# Patient Record
Sex: FEMALE | Race: OTHER | HISPANIC OR LATINO | URBAN - METROPOLITAN AREA
[De-identification: names, ages, dates, MRNs, and addresses within clinical notes are randomized per-mention and may not be internally consistent; named-entity substitution may affect disease eponyms.]

---

## 2023-01-16 ENCOUNTER — EMERGENCY (EMERGENCY)
Facility: HOSPITAL | Age: 23
LOS: 1 days | Discharge: HOME | End: 2023-01-16
Attending: EMERGENCY MEDICINE | Admitting: EMERGENCY MEDICINE
Payer: SELF-PAY

## 2023-01-16 VITALS
DIASTOLIC BLOOD PRESSURE: 79 MMHG | RESPIRATION RATE: 18 BRPM | WEIGHT: 134.48 LBS | HEIGHT: 64 IN | SYSTOLIC BLOOD PRESSURE: 118 MMHG | HEART RATE: 91 BPM | OXYGEN SATURATION: 98 % | TEMPERATURE: 98 F

## 2023-01-16 VITALS
HEART RATE: 88 BPM | DIASTOLIC BLOOD PRESSURE: 77 MMHG | RESPIRATION RATE: 18 BRPM | TEMPERATURE: 98 F | OXYGEN SATURATION: 100 % | SYSTOLIC BLOOD PRESSURE: 111 MMHG

## 2023-01-16 PROCEDURE — 99284 EMERGENCY DEPT VISIT MOD MDM: CPT

## 2023-01-16 PROCEDURE — 99283 EMERGENCY DEPT VISIT LOW MDM: CPT

## 2023-01-16 RX ORDER — CIPROFLOXACIN LACTATE 400MG/40ML
1 VIAL (ML) INTRAVENOUS
Qty: 20 | Refills: 0
Start: 2023-01-16 | End: 2023-01-25

## 2023-01-16 NOTE — ED PROVIDER NOTE - PATIENT PORTAL LINK FT
You can access the FollowMyHealth Patient Portal offered by [facility] by registering at the following website: [email or web address]. By joining [employer]’s FollowMyHealth portal, you will also be able to view your health information using other applications (apps) compatible with our system.

## 2023-01-16 NOTE — ED ADULT NURSE NOTE - OBJECTIVE STATEMENT
23 YOF A&OX3 presents for left ear pain. pt states left ear has been throbbing and painful  for 3 days. pt denies fevers/chills, cough, nasal congestion, headaches, dizziness. safety maintained.

## 2023-01-16 NOTE — ED PROVIDER NOTE - NS ED ATTENDING STATEMENT MOD
This was a shared visit with the OLIVIA. I reviewed and verified the documentation and independently performed the documented:

## 2023-01-16 NOTE — ED PROVIDER NOTE - PROGRESS NOTE DETAILS
Patient stable.  Will change antibiotics to Cipro to cover for Pseudomonas.  Discussed potential side effects of fluoroquinolones including tendon rupture and patient made aware.  Recommend close follow-up with ENT.  Discussed may need drainage for worsening symptoms or increased fluctuance.  Referrals provided.

## 2023-01-16 NOTE — ED PROVIDER NOTE - OBJECTIVE STATEMENT
Otherwise healthy 23-year-old female presents with redness, swelling, and pain to left pinna the past 2 to 3 days.  States pain moderate in nature.  Denies fever, chills, or body aches.  Denies any injury or trauma.  Denies any known bites.  No recent piercings.  No recent illnesses.  Was seen at urgent care yesterday and diagnosed with cellulitis.  Was prescribed doxycycline but did not  prescription yet.  Was told to come to emergency room for any worsening symptoms.  Noticed the swelling and pain seem to be worse this morning so came to emergency room.  Denies any pain to the inside of her ear.  no PCP

## 2023-01-16 NOTE — ED PROVIDER NOTE - ENMT, MLM
Airway patent, Throat has no vesicles, no oropharyngeal exudates and uvula is midline. TM's normal in appearance. diffuse erythema, swelling, and soft tissue tenderness to cartilage of left ear. no mastoid tenderness

## 2023-01-16 NOTE — ED PROVIDER NOTE - NSFOLLOWUPINSTRUCTIONS_ED_ALL_ED_FT
Cellulitis, Adult    A person's legs and feet. One leg is normal and the other leg is affected by cellulitis.   Cellulitis is a skin infection. The infected area is often warm, red, swollen, and sore. It occurs most often in the arms and lower legs. It is very important to get treated for this condition.      What are the causes?    This condition is caused by bacteria. The bacteria enter through a break in the skin, such as a cut, burn, insect bite, open sore, or crack.      What increases the risk?    This condition is more likely to occur in people who:  •Have a weak body defense system (immune system).       •Have open cuts, burns, bites, or scrapes on the skin.      •Are older than 60 years of age.      •Have a blood sugar problem (diabetes).       •Have a long-lasting (chronic) liver disease (cirrhosis) or kidney disease.      •Are very overweight (obese).    •Have a skin problem, such as:  •Itchy rash (eczema).      •Slow movement of blood in the veins (venous stasis).      •Fluid buildup below the skin (edema).        •Have been treated with high-energy rays (radiation).      •Use IV drugs.         What are the signs or symptoms?    Symptoms of this condition include:•Skin that is:  •Red.      •Streaking.      •Spotting.      •Swollen.      •Sore or painful when you touch it.      •Warm.        •A fever.      •Chills.       •Blisters.        How is this diagnosed?    This condition is diagnosed based on:  •Medical history.      •Physical exam.      •Blood tests.      •Imaging tests.        How is this treated?    Treatment for this condition may include:  •Medicines to treat infections or allergies.    •Home care, such as:  •Rest.      •Placing cold or warm cloths (compresses) on the skin.        •Hospital care, if the condition is very bad.        Follow these instructions at home:    Medicines     •Take over-the-counter and prescription medicines only as told by your doctor.      •If you were prescribed an antibiotic medicine, take it as told by your doctor. Do not stop taking it even if you start to feel better.        General instructions   A comparison of three sample cups showing dark yellow, yellow, and pale yellow urine.   •Drink enough fluid to keep your pee (urine) pale yellow.      • Do not touch or rub the infected area.      •Raise (elevate) the infected area above the level of your heart while you are sitting or lying down.      •Place cold or warm cloths on the area as told by your doctor.      •Keep all follow-up visits as told by your doctor. This is important.        Contact a doctor if:    •You have a fever.      •You do not start to get better after 1–2 days of treatment.      •Your bone or joint under the infected area starts to hurt after the skin has healed.      •Your infection comes back. This can happen in the same area or another area.      •You have a swollen bump in the area.      •You have new symptoms.      •You feel ill and have muscle aches and pains.        Get help right away if:    •Your symptoms get worse.      •You feel very sleepy.      •You throw up (vomit) or have watery poop (diarrhea) for a long time.      •You see red streaks coming from the area.      •Your red area gets larger.      •Your red area turns dark in color.      These symptoms may represent a serious problem that is an emergency. Do not wait to see if the symptoms will go away. Get medical help right away. Call your local emergency services (911 in the [place] Do not drive yourself to the hospital.       Summary    •Cellulitis is a skin infection. The area is often warm, red, swollen, and sore.      •This condition is treated with medicines, rest, and cold and warm cloths.      •Take all medicines only as told by your doctor.      •Tell your doctor if symptoms do not start to get better after 1–2 days of treatment.      This information is not intended to replace advice given to you by your health care provider. Make sure you discuss any questions you have with your health care provider. Start taking Cipro twice a day for next 10 days  Tylenol or Motrin over-the-counter for pain and discomfort  Ice or moist heat to the region  Have close follow-up with ENT, referrals provided.  Call tomorrow to arrange appointment      Perichondritis, Adult       Perichondritis is an infection of the outer ear (pinna) that is caused by bacteria. The pinna is shaped to direct sound into the inner ear. It gets its shape from a firm, elastic tissue (cartilage). The cartilage is protected by another tissue called the perichondrium, which is just under the skin of the pinna.    If you injure your ear, bacteria can get under the skin and infect the perichondrium. The infection can cause swelling and pain. A collection of pus (abscess) may form between the perichondrium and the cartilage. This separates the perichondrium and its blood supply from the cartilage of the ear. A severe or untreated infection can lead to a type of ear deformity called cauliflower ear.      What are the causes?    This condition is caused by bacteria that enters the perichondrium when the outer ear is injured. The injury often results from an upper ear piercing that goes through cartilage instead of the ear lobe. Other causes include:  •Injury to the outer ear (trauma).      •Using needles in or on the ear to treat problems (acupuncture).      •Insect bite.      •Ear pimple or boil.      •Ear surgery.      •Ear burn.        What increases the risk?    You are more likely to develop this condition if:  •You have diabetes.      •You have a weak body defense system (immune system).        What are the signs or symptoms?    Symptoms of this condition include:  •Dull pain that gets worse.      •Increasing ear swelling.      •Warm, tender, red skin over the ear.      •Fever.      •Swollen lymph nodes near the ear.      •Pus draining from the ear.        How is this diagnosed?    This condition may be diagnosed based on:  •Your signs and symptoms, especially if you had a recent ear injury.       •A physical exam. The health care provider will check the ear for swelling, redness, or drainage.      •Lab tests. These may include testing a sample of pus to identify the type of bacteria that is causing the infection (culture).        How is this treated?    This condition is usually treated with antibiotic medicine. You will be given antibiotics that work well against most bacteria that cause the condition. If your infection is severe, you may be given antibiotics through an IV in the hospital. Your antibiotics will be changed if culture results show that your symptoms are caused by a specific kind of bacteria.    Other treatments for this condition may include:  •Using warm or cold compresses to help with pain and discomfort.      •Taking pain medicine.      •Removing pus and dead cartilage with a surgical knife (incision and drainage) or a needle (aspiration).      •Injecting antibiotics and anti-inflammatory medicine (corticosteroids) into the affected area.        Follow these instructions at home:    Medicines     •Take over-the-counter and prescription medicines only as told by your health care provider.       •Take your antibiotic medicine as told by your health care provider. Do not stop taking the antibiotic even if you start to feel better.      Managing pain and swelling   •If directed, put ice on the affected area.  •Put ice in a plastic bag.      •Place a towel between your skin and the bag.      •Leave the ice on for 20 minutes, 2–3 times a day.      •If directed, apply heat to the affected area. Use the heat source that your health care provider recommends, such as a moist heat pack or a heating pad.  •Place a towel between your skin and the heat source.       •Leave the heat on for 20–30 minutes.       •Remove the heat if your skin turns bright red. This is especially important if you are unable to feel pain, heat, or cold. You may have a greater risk of getting burned.        General instructions   •If you had incision and drainage done, follow instructions from your health care provider about how to take care of your incision. Make sure you:   •Wash your hands with soap and water before and after you change your bandage (dressing). If soap and water are not available, use hand .       •Change your dressing as told by your health care provider.       •Check your incision area every day for more redness, swelling, pain, warmth, pus, or a bad smell.      •Do not take baths, swim, or use a hot tub until your health care provider approves.         •Clean the ear as told by your health care provider.      •Resume your usual activities as told by your health care provider. Ask your health care provider what activities are safe for you.       •Keep all follow-up visits as told by your health care provider. This is important.        Contact a health care provider if you have:    •New or increasing chills or fever.      •Any signs of infection that get worse or do not get better. These may include more redness or drainage.      •A severe headache.        Get help right away if you:    •Develop a sudden increase in symptoms such as pain, fever, and swelling.        Summary    •Perichondritis is a bacterial infection of your outer ear. If you injure your ear, bacteria can get under the skin and infect the perichondrium.      •The main symptoms of this condition are pain, swelling, and tender, red skin over the ear.      •This condition can be diagnosed by your signs and symptoms, especially after an ear injury.      •Perichondritis is usually treated with antibiotic medicine. Other treatments include removing pus and dead cartilage with a surgical knife (incision and drainage) or a needle (aspiration).      •Follow instructions about taking medicines, managing pain and swelling, and taking care of your incision if you had incision and drainage done.      This information is not intended to replace advice given to you by your health care provider. Make sure you discuss any questions you have with your health care provider.

## 2023-01-16 NOTE — ED PROVIDER NOTE - CLINICAL SUMMARY MEDICAL DECISION MAKING FREE TEXT BOX
Patient is a 23-year-old female who presents to the emergency room with a chief complaint of redness and swelling to the left ear.  Patient with no significant past medical history.  Reports that 2 weeks ago she did have a left ear infection which was treated with an unknown antibiotic and resolved.  She reports that 2 to 3 days ago she began to experience pain redness and swelling to the left outer ear.  Symptoms have continued to worsen.  Denies any trauma, injury, bite, new piercing to the left ear.  She describes the pain as a throbbing sensation endorses redness and swelling seems to be worsening.  She was seen in urgent care yesterday diagnosed with cellulitis and prescribed doxycycline.  Patient reports that she has not picked up this antibiotic or began it yet.  She was instructed to come to the emergency room for any worsening symptoms and she thought the swelling might of been more significant and so she reported to the emergency room today for further management.  Denies any fevers chills nausea vomiting chest pain or shortness of breath.  On exam patient is sitting up in bed no acute distress.  Left ear: TM clear no mastoid tenderness to palpation.  There is diffuse redness swelling and tenderness to the cartilage of the left ear with increased warmth.  Patient is presenting to the emergency room with what appears to be perichondritis of the left ear.  Will change antibiotics from doxycycline to Cipro for better coverage.  Patient advises that she needs prompt ENT follow-up in 1 to 2 days for recheck and may require drainage of this tear.  Advised to return to the emergency room immediately for any new or worsening symptoms.  Patient denies any chance of pregnancy.  Stable for discharge home.

## 2023-01-19 PROBLEM — Z00.00 ENCOUNTER FOR PREVENTIVE HEALTH EXAMINATION: Status: ACTIVE | Noted: 2023-01-19

## 2023-01-19 PROBLEM — Z78.9 OTHER SPECIFIED HEALTH STATUS: Chronic | Status: ACTIVE | Noted: 2023-01-16

## 2023-01-20 ENCOUNTER — EMERGENCY (EMERGENCY)
Facility: HOSPITAL | Age: 23
LOS: 1 days | Discharge: HOME | End: 2023-01-20
Attending: STUDENT IN AN ORGANIZED HEALTH CARE EDUCATION/TRAINING PROGRAM | Admitting: STUDENT IN AN ORGANIZED HEALTH CARE EDUCATION/TRAINING PROGRAM
Payer: SELF-PAY

## 2023-01-20 VITALS
HEIGHT: 64 IN | OXYGEN SATURATION: 100 % | SYSTOLIC BLOOD PRESSURE: 134 MMHG | HEART RATE: 118 BPM | TEMPERATURE: 98 F | RESPIRATION RATE: 17 BRPM | DIASTOLIC BLOOD PRESSURE: 83 MMHG

## 2023-01-20 VITALS
HEART RATE: 74 BPM | SYSTOLIC BLOOD PRESSURE: 113 MMHG | OXYGEN SATURATION: 100 % | DIASTOLIC BLOOD PRESSURE: 75 MMHG | TEMPERATURE: 98 F | RESPIRATION RATE: 17 BRPM

## 2023-01-20 LAB
ALBUMIN SERPL ELPH-MCNC: 4.2 G/DL — SIGNIFICANT CHANGE UP (ref 3.3–5)
ALP SERPL-CCNC: 73 U/L — SIGNIFICANT CHANGE UP (ref 40–120)
ALT FLD-CCNC: 9 U/L — SIGNIFICANT CHANGE UP (ref 4–33)
ANION GAP SERPL CALC-SCNC: 11 MMOL/L — SIGNIFICANT CHANGE UP (ref 7–14)
AST SERPL-CCNC: 15 U/L — SIGNIFICANT CHANGE UP (ref 4–32)
BASOPHILS # BLD AUTO: 0.04 K/UL — SIGNIFICANT CHANGE UP (ref 0–0.2)
BASOPHILS NFR BLD AUTO: 0.6 % — SIGNIFICANT CHANGE UP (ref 0–2)
BILIRUB SERPL-MCNC: 0.2 MG/DL — SIGNIFICANT CHANGE UP (ref 0.2–1.2)
BUN SERPL-MCNC: 9 MG/DL — SIGNIFICANT CHANGE UP (ref 7–23)
CALCIUM SERPL-MCNC: 9.4 MG/DL — SIGNIFICANT CHANGE UP (ref 8.4–10.5)
CHLORIDE SERPL-SCNC: 106 MMOL/L — SIGNIFICANT CHANGE UP (ref 98–107)
CO2 SERPL-SCNC: 24 MMOL/L — SIGNIFICANT CHANGE UP (ref 22–31)
CREAT SERPL-MCNC: 0.81 MG/DL — SIGNIFICANT CHANGE UP (ref 0.5–1.3)
EGFR: 103 ML/MIN/1.73M2 — SIGNIFICANT CHANGE UP
EOSINOPHIL # BLD AUTO: 0.09 K/UL — SIGNIFICANT CHANGE UP (ref 0–0.5)
EOSINOPHIL NFR BLD AUTO: 1.3 % — SIGNIFICANT CHANGE UP (ref 0–6)
GLUCOSE SERPL-MCNC: 90 MG/DL — SIGNIFICANT CHANGE UP (ref 70–99)
HCT VFR BLD CALC: 40.3 % — SIGNIFICANT CHANGE UP (ref 34.5–45)
HGB BLD-MCNC: 12.7 G/DL — SIGNIFICANT CHANGE UP (ref 11.5–15.5)
IANC: 4.37 K/UL — SIGNIFICANT CHANGE UP (ref 1.8–7.4)
IMM GRANULOCYTES NFR BLD AUTO: 0.4 % — SIGNIFICANT CHANGE UP (ref 0–0.9)
LYMPHOCYTES # BLD AUTO: 1.8 K/UL — SIGNIFICANT CHANGE UP (ref 1–3.3)
LYMPHOCYTES # BLD AUTO: 26 % — SIGNIFICANT CHANGE UP (ref 13–44)
MCHC RBC-ENTMCNC: 27.3 PG — SIGNIFICANT CHANGE UP (ref 27–34)
MCHC RBC-ENTMCNC: 31.5 GM/DL — LOW (ref 32–36)
MCV RBC AUTO: 86.5 FL — SIGNIFICANT CHANGE UP (ref 80–100)
MONOCYTES # BLD AUTO: 0.59 K/UL — SIGNIFICANT CHANGE UP (ref 0–0.9)
MONOCYTES NFR BLD AUTO: 8.5 % — SIGNIFICANT CHANGE UP (ref 2–14)
NEUTROPHILS # BLD AUTO: 4.37 K/UL — SIGNIFICANT CHANGE UP (ref 1.8–7.4)
NEUTROPHILS NFR BLD AUTO: 63.2 % — SIGNIFICANT CHANGE UP (ref 43–77)
NRBC # BLD: 0 /100 WBCS — SIGNIFICANT CHANGE UP (ref 0–0)
NRBC # FLD: 0 K/UL — SIGNIFICANT CHANGE UP (ref 0–0)
PLATELET # BLD AUTO: 241 K/UL — SIGNIFICANT CHANGE UP (ref 150–400)
POTASSIUM SERPL-MCNC: 4.3 MMOL/L — SIGNIFICANT CHANGE UP (ref 3.5–5.3)
POTASSIUM SERPL-SCNC: 4.3 MMOL/L — SIGNIFICANT CHANGE UP (ref 3.5–5.3)
PROT SERPL-MCNC: 7.6 G/DL — SIGNIFICANT CHANGE UP (ref 6–8.3)
RBC # BLD: 4.66 M/UL — SIGNIFICANT CHANGE UP (ref 3.8–5.2)
RBC # FLD: 13 % — SIGNIFICANT CHANGE UP (ref 10.3–14.5)
SODIUM SERPL-SCNC: 141 MMOL/L — SIGNIFICANT CHANGE UP (ref 135–145)
WBC # BLD: 6.92 K/UL — SIGNIFICANT CHANGE UP (ref 3.8–10.5)
WBC # FLD AUTO: 6.92 K/UL — SIGNIFICANT CHANGE UP (ref 3.8–10.5)

## 2023-01-20 PROCEDURE — 70481 CT ORBIT/EAR/FOSSA W/DYE: CPT | Mod: 26,MA

## 2023-01-20 PROCEDURE — 99284 EMERGENCY DEPT VISIT MOD MDM: CPT

## 2023-01-20 RX ORDER — KETOROLAC TROMETHAMINE 30 MG/ML
15 SYRINGE (ML) INJECTION ONCE
Refills: 0 | Status: DISCONTINUED | OUTPATIENT
Start: 2023-01-20 | End: 2023-01-20

## 2023-01-20 RX ORDER — CEPHALEXIN 500 MG
1 CAPSULE ORAL
Qty: 28 | Refills: 0
Start: 2023-01-20 | End: 2023-01-26

## 2023-01-20 RX ORDER — SODIUM CHLORIDE 9 MG/ML
1000 INJECTION INTRAMUSCULAR; INTRAVENOUS; SUBCUTANEOUS ONCE
Refills: 0 | Status: COMPLETED | OUTPATIENT
Start: 2023-01-20 | End: 2023-01-20

## 2023-01-20 RX ORDER — CEPHALEXIN 500 MG
500 CAPSULE ORAL ONCE
Refills: 0 | Status: COMPLETED | OUTPATIENT
Start: 2023-01-20 | End: 2023-01-20

## 2023-01-20 RX ADMIN — Medication 15 MILLIGRAM(S): at 12:40

## 2023-01-20 RX ADMIN — Medication 500 MILLIGRAM(S): at 14:26

## 2023-01-20 RX ADMIN — SODIUM CHLORIDE 2000 MILLILITER(S): 9 INJECTION INTRAMUSCULAR; INTRAVENOUS; SUBCUTANEOUS at 12:40

## 2023-01-20 NOTE — ED PROVIDER NOTE - PATIENT PORTAL LINK FT
You can access the FollowMyHealth Patient Portal offered by [facility] by registering at the following website: [email or web address]. By joining [employer]’s FollowMyHealth portal, you will also be able to view your health information using other applications (apps) compatible with our system.

## 2023-01-20 NOTE — ED PROVIDER NOTE - ATTENDING CONTRIBUTION TO CARE
I (Malu) agree with above, I performed a history and physical. Counseled daniel medical staff, physician assistant, and/or medical student on medical decision making as documented. Medical decisions and treatment interventions were made in real time during the patient encounter. Additionally and/or with the following exceptions: Patient is a 23-year-old female who is presenting to the emergency department with left ear pain.  She also has erythema.  Has been going on for a few days.  Of note last month she was treated for otitis media.  She has an area of erythema of the left pinna with some radiation of erythema to the mastoid process.  Minimal tenderness to palpation at the base of the ear/mastoid.  Bilateral tympanic membranes had good light reflex and no evidence of effusion or purulence.  Patient was signed out to oncoming attending pending results of CT imaging to rule out mastoiditis.  If negative for mastoiditis can be treated for cellulitis.  CBC was within normal limits and CMP was also within normal limits

## 2023-01-20 NOTE — ED PROVIDER NOTE - OBJECTIVE STATEMENT
23-year-old female with no past medical history presenting with ear pain.  Patient had inner ear infection recently which improved with antibiotics.  Now coming with couple days of external ear pain, redness and swelling to the pinna. received doxycycline at urgent care which she has taken a couple days of without improvement.  No fever, chest pain, shortness of breath, abdominal pain, nausea, vomiting.

## 2023-01-20 NOTE — ED PROVIDER NOTE - CLINICAL SUMMARY MEDICAL DECISION MAKING FREE TEXT BOX
23-year-old female with no past medical history presenting with ear pain.  Patient had inner ear infection recently which improved with antibiotics.  Now coming with couple days of external ear pain, redness and swelling to the pinna. received doxycycline at urgent care which she has taken a couple days of without improvement.  No fever, chest pain, shortness of breath, abdominal pain, nausea, vomiting. Exam shows L pinna swelling, erythema, ttp. mastoid ttp. c/f cellulitis vs. mastoiditis. will get labs, CT to eval mastoiditis.

## 2023-01-20 NOTE — ED ADULT TRIAGE NOTE - CHIEF COMPLAINT QUOTE
pt was treated for an inner ear infection, states after finishing the treatement she developed swelling to the external ear.

## 2023-01-20 NOTE — ED PROVIDER NOTE - PHYSICAL EXAMINATION
General appearance: NAD, conversant, afebrile    Eyes: anicteric sclerae, RAF, EOMI   HENT: L pinna erythema, swelling, ttp. mastoid ttp; TM clear, normal canal; oropharynx clear, MMM and no ulcerations, no pharyngeal erythema or exudate   Neck: Trachea midline; Full range of motion, supple   Pulm: CTA bl, normal respiratory effort and no intercostal retractions, normal work of breathing   CV: RRR, No murmurs, rubs, or gallops. 2+ peripheral pulses.   Abdomen: Soft, non-tender, non-distended; no guarding or rebound   Extremities: No peripheral edema or extremity lymphadenopathy. 5/5 strength in all four extremities.   Skin: Dry, normal temperature, turgor and texture; no rash, ulcers or subcutaneous nodules   Psych: Appropriate affect, cooperative; alert and oriented to person, place and time

## 2023-01-20 NOTE — ED PROVIDER NOTE - PROGRESS NOTE DETAILS
Phyllis Moore- pt feels stable. ct negative for mastoiditis or abscess, shows cellulitis. discussed abx, discharge w/ f/u. pt agrees.

## 2023-01-20 NOTE — ED PROVIDER NOTE - NSFOLLOWUPINSTRUCTIONS_ED_ALL_ED_FT
You were seen in the ER for ear pain. Your lab results were within normal limits. You CT scan showed cellulitis, no mastoiditis or abscess. You were given an antibiotic for your infection. You can take tylenol and/or motrin for your pain. Please follow the instructions on the packaging.    A prescription for cephalexin (antibiotic) was sent to your pharmacy. Please take it as instructed.    Please follow up with your primary care doctor.    Please return to the ER if you have worsening symptoms including fever, chest pain, shortness of breath, abdominal pain, nausea, vomiting, diarrhea, weakness or lightheadedness/fainting.

## 2023-02-01 ENCOUNTER — APPOINTMENT (OUTPATIENT)
Dept: OTOLARYNGOLOGY | Facility: CLINIC | Age: 23
End: 2023-02-01

## 2023-02-06 ENCOUNTER — APPOINTMENT (OUTPATIENT)
Dept: OTOLARYNGOLOGY | Facility: CLINIC | Age: 23
End: 2023-02-06

## 2023-07-12 ENCOUNTER — NON-APPOINTMENT (OUTPATIENT)
Age: 23
End: 2023-07-12

## 2023-07-31 ENCOUNTER — APPOINTMENT (OUTPATIENT)
Dept: OBGYN | Facility: CLINIC | Age: 23
End: 2023-07-31
Payer: COMMERCIAL

## 2023-07-31 VITALS
HEIGHT: 64 IN | BODY MASS INDEX: 23.05 KG/M2 | DIASTOLIC BLOOD PRESSURE: 60 MMHG | WEIGHT: 135 LBS | SYSTOLIC BLOOD PRESSURE: 90 MMHG

## 2023-07-31 DIAGNOSIS — Z78.9 OTHER SPECIFIED HEALTH STATUS: ICD-10-CM

## 2023-07-31 DIAGNOSIS — Z01.419 ENCOUNTER FOR GYNECOLOGICAL EXAMINATION (GENERAL) (ROUTINE) W/OUT ABNORMAL FINDINGS: ICD-10-CM

## 2023-07-31 PROCEDURE — 99385 PREV VISIT NEW AGE 18-39: CPT

## 2023-07-31 NOTE — HISTORY OF PRESENT ILLNESS
[Y] : Patient is sexually active [N] : Patient denies prior pregnancies [Regular Cycle Intervals] : periods have been regular [Frequency: Q ___ days] : menstrual periods occur approximately every [unfilled] days [Menarche Age: ____] : age at menarche was [unfilled] [PGHxTotal] : 0 [PGHxFullTerm] : 0 [PGHxPremature] : 0 [PGHxAbortions] : 0 [[facility] HospitalxLiving] : 0 [PGHxABInduced] : 0 [PGHxABSpont] : 0 [PGHxEctopic] : 0 [PGHxMultBirths] : 0

## 2023-08-03 LAB — CYTOLOGY CVX/VAG DOC THIN PREP: NORMAL

## 2023-08-21 ENCOUNTER — APPOINTMENT (OUTPATIENT)
Dept: OBGYN | Facility: CLINIC | Age: 23
End: 2023-08-21
Payer: COMMERCIAL

## 2023-08-21 VITALS
DIASTOLIC BLOOD PRESSURE: 66 MMHG | HEIGHT: 64 IN | WEIGHT: 137 LBS | BODY MASS INDEX: 23.39 KG/M2 | SYSTOLIC BLOOD PRESSURE: 110 MMHG

## 2023-08-21 DIAGNOSIS — N76.0 ACUTE VAGINITIS: ICD-10-CM

## 2023-08-21 DIAGNOSIS — Z30.09 ENCOUNTER FOR OTHER GENERAL COUNSELING AND ADVICE ON CONTRACEPTION: ICD-10-CM

## 2023-08-21 PROCEDURE — 99213 OFFICE O/P EST LOW 20 MIN: CPT

## 2023-08-21 RX ORDER — DROSPIRENONE AND ETHINYL ESTRADIOL 0.02-3(28)
3-0.02 KIT ORAL DAILY
Qty: 1 | Refills: 5 | Status: ACTIVE | COMMUNITY
Start: 2023-08-21 | End: 1900-01-01

## 2023-08-21 RX ORDER — METRONIDAZOLE 7.5 MG/G
0.75 GEL VAGINAL
Qty: 1 | Refills: 0 | Status: ACTIVE | COMMUNITY
Start: 2023-08-21 | End: 1900-01-01

## 2023-09-07 LAB
C TRACH RRNA SPEC QL NAA+PROBE: NOT DETECTED
CANDIDA VAG CYTO: NOT DETECTED
G VAGINALIS+PREV SP MTYP VAG QL MICRO: DETECTED
N GONORRHOEA RRNA SPEC QL NAA+PROBE: NOT DETECTED
SOURCE AMPLIFICATION: NORMAL
T VAGINALIS VAG QL WET PREP: NOT DETECTED

## 2023-09-07 NOTE — HISTORY OF PRESENT ILLNESS
[FreeTextEntry1] : 22yo P0 presents with complaints of abnormal discharge with odor. Reports this is the first time this is happening LMP 08/07/2023

## 2023-09-07 NOTE — PLAN
[FreeTextEntry1] : PLAN  Discussed R/B/A Educated on warning signs of contraception  AFfirm swab obtained. Treated with abx for BV GC/CT-- obtained  RTO prn

## 2023-09-07 NOTE — PHYSICAL EXAM
[Appropriately responsive] : appropriately responsive [Alert] : alert [No Acute Distress] : no acute distress [Soft] : soft [Non-tender] : non-tender [Non-distended] : non-distended [No HSM] : No HSM [No Lesions] : no lesions [No Mass] : no mass [Oriented x3] : oriented x3 [Labia Majora] : normal [Labia Minora] : normal [Discharge] : a  ~M vaginal discharge was present [Moderate] : moderate [Foul Smelling] : foul smelling [White] : white [Thin] : thin [Normal] : normal [Uterine Adnexae] : normal
